# Patient Record
Sex: FEMALE | Race: OTHER | NOT HISPANIC OR LATINO | URBAN - METROPOLITAN AREA
[De-identification: names, ages, dates, MRNs, and addresses within clinical notes are randomized per-mention and may not be internally consistent; named-entity substitution may affect disease eponyms.]

---

## 2018-01-19 ENCOUNTER — OUTPATIENT (OUTPATIENT)
Dept: OUTPATIENT SERVICES | Facility: HOSPITAL | Age: 33
LOS: 1 days | End: 2018-01-19
Payer: COMMERCIAL

## 2018-01-19 PROCEDURE — 74740 X-RAY FEMALE GENITAL TRACT: CPT

## 2018-01-19 PROCEDURE — 74740 X-RAY FEMALE GENITAL TRACT: CPT | Mod: 26

## 2018-01-19 PROCEDURE — 58340 CATHETER FOR HYSTEROGRAPHY: CPT

## 2018-04-03 PROBLEM — Z00.00 ENCOUNTER FOR PREVENTIVE HEALTH EXAMINATION: Status: ACTIVE | Noted: 2018-04-03

## 2018-04-11 ENCOUNTER — APPOINTMENT (OUTPATIENT)
Dept: HUMAN REPRODUCTION | Facility: CLINIC | Age: 33
End: 2018-04-11
Payer: COMMERCIAL

## 2018-04-11 PROCEDURE — 76830 TRANSVAGINAL US NON-OB: CPT

## 2018-04-11 PROCEDURE — 99205 OFFICE O/P NEW HI 60 MIN: CPT | Mod: 25

## 2018-04-11 PROCEDURE — 36415 COLL VENOUS BLD VENIPUNCTURE: CPT

## 2019-02-11 ENCOUNTER — HOSPITAL ENCOUNTER (EMERGENCY)
Dept: HOSPITAL 74 - JER | Age: 34
Discharge: HOME | End: 2019-02-11
Payer: COMMERCIAL

## 2019-02-11 VITALS — DIASTOLIC BLOOD PRESSURE: 67 MMHG | SYSTOLIC BLOOD PRESSURE: 103 MMHG | TEMPERATURE: 99 F | HEART RATE: 74 BPM

## 2019-02-11 VITALS — BODY MASS INDEX: 24.7 KG/M2

## 2019-02-11 DIAGNOSIS — O98.511: ICD-10-CM

## 2019-02-11 DIAGNOSIS — B33.8: ICD-10-CM

## 2019-02-11 DIAGNOSIS — O26.891: Primary | ICD-10-CM

## 2019-02-11 DIAGNOSIS — Z3A.13: ICD-10-CM

## 2019-02-11 LAB
ALBUMIN SERPL-MCNC: 3.2 G/DL (ref 3.4–5)
ALP SERPL-CCNC: 61 U/L (ref 45–117)
ALT SERPL-CCNC: 15 U/L (ref 13–61)
ANION GAP SERPL CALC-SCNC: 7 MMOL/L (ref 8–16)
AST SERPL-CCNC: 11 U/L (ref 15–37)
BASOPHILS # BLD: 0.3 % (ref 0–2)
BILIRUB SERPL-MCNC: 0.2 MG/DL (ref 0.2–1)
BUN SERPL-MCNC: 8 MG/DL (ref 7–18)
CALCIUM SERPL-MCNC: 8.5 MG/DL (ref 8.5–10.1)
CHLORIDE SERPL-SCNC: 107 MMOL/L (ref 98–107)
CO2 SERPL-SCNC: 25 MMOL/L (ref 21–32)
CREAT SERPL-MCNC: 0.4 MG/DL (ref 0.55–1.3)
DEPRECATED RDW RBC AUTO: 12.7 % (ref 11.6–15.6)
EOSINOPHIL # BLD: 0.8 % (ref 0–4.5)
GLUCOSE SERPL-MCNC: 93 MG/DL (ref 74–106)
HCT VFR BLD CALC: 37 % (ref 32.4–45.2)
HGB BLD-MCNC: 13.1 GM/DL (ref 10.7–15.3)
LYMPHOCYTES # BLD: 20.2 % (ref 8–40)
MCH RBC QN AUTO: 32.5 PG (ref 25.7–33.7)
MCHC RBC AUTO-ENTMCNC: 35.5 G/DL (ref 32–36)
MCV RBC: 91.5 FL (ref 80–96)
MONOCYTES # BLD AUTO: 5 % (ref 3.8–10.2)
NEUTROPHILS # BLD: 73.7 % (ref 42.8–82.8)
PLATELET # BLD AUTO: 205 K/MM3 (ref 134–434)
PMV BLD: 7.9 FL (ref 7.5–11.1)
POTASSIUM SERPLBLD-SCNC: 3.5 MMOL/L (ref 3.5–5.1)
PROT SERPL-MCNC: 6.6 G/DL (ref 6.4–8.2)
RBC # BLD AUTO: 4.04 M/MM3 (ref 3.6–5.2)
SODIUM SERPL-SCNC: 138 MMOL/L (ref 136–145)
WBC # BLD AUTO: 9.5 K/MM3 (ref 4–10)

## 2019-02-11 PROCEDURE — 3E0333Z INTRODUCTION OF ANTI-INFLAMMATORY INTO PERIPHERAL VEIN, PERCUTANEOUS APPROACH: ICD-10-PCS

## 2019-02-11 PROCEDURE — 3E033NZ INTRODUCTION OF ANALGESICS, HYPNOTICS, SEDATIVES INTO PERIPHERAL VEIN, PERCUTANEOUS APPROACH: ICD-10-PCS

## 2019-02-11 NOTE — PDOC
History of Present Illness





- General


Chief Complaint: Weakness


Stated Complaint: WEAKNESS


Time Seen by Provider: 19 20:06


History Source: Patient


Exam Limitations: No Limitations





- History of Present Illness


Initial Comments: 





19 20:27


The patient is a 33F  at 13 weeks who presents to the ER with upper 

respiratory complaints. The patient states that for the past 3 days, she's had 

chills with worsening throat pain. She admits to her son having a URI which he 

no longer has. She states that since yesterday, she has had worsening tolerance 

of her own secretions. She admits to mild nausea without vomiting. She denies 

any cough, CP, SOB, abdominal pain, dysuria. She denies any change in her 

voice. 





Past History





- Past Medical History


Allergies/Adverse Reactions: 


 Allergies











Allergy/AdvReac Type Severity Reaction Status Date / Time


 


No Known Allergies Allergy   Verified 19 20:24











COPD: No





- Suicide/Smoking/Psychosocial Hx


Smoking History: Never smoked


Have you smoked in the past 12 months: No


Information on smoking cessation initiated: No


Hx Alcohol Use: No


Drug/Substance Use Hx: No





**Review of Systems





- Review of Systems


Able to Perform ROS?: Yes


Is the patient limited English proficient: No


Constitutional: Yes: Chills.  No: Fever


HEENTM: Yes: Throat Pain, Throat Swelling


Respiratory: No: Cough, Shortness of Breath


Cardiac (ROS): No: Chest Pain, Chest Tightness


ABD/GI: Yes: Nausea.  No: Vomiting





*Physical Exam





- Vital Signs


 Last Vital Signs











Temp Pulse Resp BP Pulse Ox


 


 99.0 F   74   18   103/67   100 


 


 19 20:01  19 20:01  19 20:01  19 20:01  19 20:01














- Physical Exam


General Appearance: Yes: Nourished, Appropriately Dressed


HEENT: positive: Normal Voice, Other (Uvula midline but enlarged with 

erythematous oropharnyx.).  negative: Tonsillar Exudate, Tonsillar Erythema, 

Nasal Congestion


Neck: positive: Trachea midline.  negative: Tender


Respiratory/Chest: positive: Lungs Clear, Normal Breath Sounds.  negative: 

Respiratory Distress


Cardiovascular: positive: Regular Rhythm, Regular Rate, S1, S2.  negative: 

Diastolic Murmur, Systolic Murmur


Gastrointestinal/Abdominal: positive: Flat, Soft.  negative: Tender


Musculoskeletal: negative: CVA Tenderness, CVA Tenderness (R), CVA Tenderness (L

)


Extremity: positive: Normal Capillary Refill, Normal Inspection, Normal Range 

of Motion


Integumentary: positive: Normal Color, Dry, Warm


Neurologic: positive: Alert, Normal Mood/Affect





Moderate Sedation





- Procedure Monitoring


Vital Signs: 


Procedure Monitoring Vital Signs











Temperature  99.0 F   19 20:01


 


Pulse Rate  74   19 20:01


 


Respiratory Rate  18   19 20:01


 


Blood Pressure  103/67   19 20:01


 


O2 Sat by Pulse Oximetry (%)  100   19 20:01











ED Treatment Course





- LABORATORY


CBC & Chemistry Diagram: 


 19 20:39





 19 20:39





Medical Decision Making





- Medical Decision Making





19 21:15


The patient is a 33F  at 13 weeks with no PMH who presents to the ER with 

worsening throat pain. Strep negative. Uvula enlarged but midline without 

tonsillar or peritonsillar swelling. Voice normal. Giving steroids (d/w 

pharmacy for recs), fluids, and pain control and will reassess.





19 22:12


All labs negative including CBC, CMP, influenza, and strep. Pt states she feels 

better. Will d/c with PCP f/u.





*DC/Admit/Observation/Transfer


Diagnosis at time of Disposition: 


 Viral illness








- Discharge Dispostion


Disposition: HOME


Condition at time of disposition: Stable


Decision to Admit order: No





- Referrals





- Patient Instructions


Printed Discharge Instructions:  Common Cold


Additional Instructions: 


Please take tylenol as needed for aches and sore throat. Drink lots of fluids 

and eat/drink cold things for your throat. Please follow up with your PCP in 1-

3 days. Please return to the ER if you have any worsening symptoms. Take your 

medications as prescribed.





- Post Discharge Activity

## 2019-02-11 NOTE — PDOC
Attending Attestation





- HPI


HPI: 





19 21:12


The patient is a 33 year old 13 weeks pregnant female , with no significant 

past medical history, who presents to the emergency department with, difficulty 

swallowing since yesterday. Patient endorses for the past 3 days she has been 

experiencing upper respiratory symptoms such as chills. She denies recent chest 

pain or shortness of breath.





Allergies: NKDA 








- Physicial Exam


PE: 





19 21:12





GENERAL: 


Well-appearing, well-nourished. No apparent distress.


HEENT: 


Erythematous throat. No exudates. Normocephalic, atraumatic. PERRL, EOM intact.


CARDIOVASCULAR: 


Normal S1, S2. Regular rate and rhythm.


PULMONARY: 


Clear to auscultation bilaterally.


ABDOMEN: 


Soft, non-distended, non-tender. 


EXTREMITIES: 


Normal ROM in all four extremities. No gross deformities.


SKIN: 


Warm, dry.  No rash


NEUROLOGICAL: 


No focal neurological deficits.








<Meredith Redman - Last Filed: 19 21:12>





- Resident


Resident Name: Atif Tavares





- ED Attending Attestation


I have performed the following: I have examined & evaluated the patient, The 

case was reviewed & discussed with the resident, I agree w/resident's findings 

& plan, Exceptions are as noted





- Medical Decision Making





19 21:22


33-year-old pregnant female presents with nasal congestion and sore throat, 

body aches and chills.


Both his son and  have had similar symptoms


strep swab negative


cbc unremarkable


influenza sent


pt receiving tylenol and IVF


imp: uri








<Amparo Jacinto - Last Filed: 19 21:25>





Attestations





- Attestations





19 21:13





Documentation prepared by Meredith Redman, acting as medical scribe for 

Amparo Jacinto MD.





<Meredith Redman - Last Filed: 19 21:12>